# Patient Record
Sex: MALE | Race: BLACK OR AFRICAN AMERICAN | Employment: PART TIME | URBAN - METROPOLITAN AREA
[De-identification: names, ages, dates, MRNs, and addresses within clinical notes are randomized per-mention and may not be internally consistent; named-entity substitution may affect disease eponyms.]

---

## 2017-04-06 ENCOUNTER — APPOINTMENT (OUTPATIENT)
Dept: GENERAL RADIOLOGY | Age: 57
End: 2017-04-06
Payer: MEDICARE

## 2017-04-06 ENCOUNTER — HOSPITAL ENCOUNTER (EMERGENCY)
Age: 57
Discharge: HOME | End: 2017-04-06
Attending: EMERGENCY MEDICINE
Payer: MEDICARE

## 2017-04-06 VITALS
BODY MASS INDEX: 37.03 KG/M2 | HEART RATE: 80 BPM | WEIGHT: 250 LBS | RESPIRATION RATE: 16 BRPM | HEIGHT: 69 IN | DIASTOLIC BLOOD PRESSURE: 85 MMHG | OXYGEN SATURATION: 100 % | SYSTOLIC BLOOD PRESSURE: 124 MMHG | TEMPERATURE: 97.6 F

## 2017-04-06 DIAGNOSIS — M25.461 EFFUSION OF KNEE JOINT RIGHT: Primary | ICD-10-CM

## 2017-04-06 PROCEDURE — 99283 EMERGENCY DEPT VISIT LOW MDM: CPT

## 2017-04-06 PROCEDURE — 73562 X-RAY EXAM OF KNEE 3: CPT

## 2017-04-06 RX ORDER — NAPROXEN 500 MG/1
500 TABLET ORAL 2 TIMES DAILY WITH MEALS
Qty: 30 TABLET | Refills: 0 | Status: SHIPPED | OUTPATIENT
Start: 2017-04-06 | End: 2017-05-29

## 2017-04-06 RX ORDER — NAPROXEN 500 MG/1
500 TABLET ORAL 2 TIMES DAILY WITH MEALS
Qty: 60 TABLET | Refills: 0 | Status: SHIPPED | OUTPATIENT
Start: 2017-04-06 | End: 2017-04-06

## 2017-04-06 ASSESSMENT — ENCOUNTER SYMPTOMS
EYES NEGATIVE: 1
RESPIRATORY NEGATIVE: 1
GASTROINTESTINAL NEGATIVE: 1
ALLERGIC/IMMUNOLOGIC NEGATIVE: 1

## 2017-04-06 ASSESSMENT — PAIN DESCRIPTION - ORIENTATION: ORIENTATION: RIGHT

## 2017-04-06 ASSESSMENT — PAIN DESCRIPTION - LOCATION: LOCATION: KNEE

## 2017-04-06 ASSESSMENT — PAIN SCALES - GENERAL: PAINLEVEL_OUTOF10: 8

## 2017-05-29 ENCOUNTER — HOSPITAL ENCOUNTER (EMERGENCY)
Age: 57
Discharge: HOME | End: 2017-05-30
Attending: EMERGENCY MEDICINE
Payer: MEDICARE

## 2017-05-29 VITALS
DIASTOLIC BLOOD PRESSURE: 99 MMHG | SYSTOLIC BLOOD PRESSURE: 155 MMHG | HEIGHT: 66 IN | TEMPERATURE: 98.1 F | HEART RATE: 86 BPM | RESPIRATION RATE: 22 BRPM | WEIGHT: 257.5 LBS | BODY MASS INDEX: 41.38 KG/M2 | OXYGEN SATURATION: 98 %

## 2017-05-29 DIAGNOSIS — Z76.0 ENCOUNTER FOR MEDICATION REFILL: ICD-10-CM

## 2017-05-29 DIAGNOSIS — M79.89 LEG SWELLING: ICD-10-CM

## 2017-05-29 DIAGNOSIS — M25.561 RIGHT KNEE PAIN, UNSPECIFIED CHRONICITY: Primary | ICD-10-CM

## 2017-05-29 LAB
ABSOLUTE EOS #: 0.2 K/UL (ref 0–0.4)
ABSOLUTE LYMPH #: 1.9 K/UL (ref 1–4.8)
ABSOLUTE MONO #: 0.3 K/UL (ref 0.2–0.8)
BASOPHILS # BLD: 1 %
BASOPHILS ABSOLUTE: 0.1 K/UL (ref 0–0.2)
D-DIMER QUANTITATIVE: 1.23 MG/L FEU
DIFFERENTIAL TYPE: ABNORMAL
EOSINOPHILS RELATIVE PERCENT: 3 %
HCT VFR BLD CALC: 38.2 % (ref 41–53)
HEMOGLOBIN: 12.4 G/DL (ref 13.5–17.5)
LYMPHOCYTES # BLD: 27 %
MCH RBC QN AUTO: 26.2 PG (ref 26–34)
MCHC RBC AUTO-ENTMCNC: 32.4 G/DL (ref 31–37)
MCV RBC AUTO: 81 FL (ref 80–100)
MONOCYTES # BLD: 5 %
PDW BLD-RTO: 17.1 % (ref 11.5–14.5)
PLATELET # BLD: 229 K/UL (ref 130–400)
PLATELET ESTIMATE: ABNORMAL
PMV BLD AUTO: ABNORMAL FL (ref 6–12)
RBC # BLD: 4.72 M/UL (ref 4.5–5.9)
RBC # BLD: ABNORMAL 10*6/UL
SEG NEUTROPHILS: 64 %
SEGMENTED NEUTROPHILS ABSOLUTE COUNT: 4.6 K/UL (ref 1.8–7.7)
WBC # BLD: 7.1 K/UL (ref 3.5–11)
WBC # BLD: ABNORMAL 10*3/UL

## 2017-05-29 PROCEDURE — 36415 COLL VENOUS BLD VENIPUNCTURE: CPT

## 2017-05-29 PROCEDURE — 85379 FIBRIN DEGRADATION QUANT: CPT

## 2017-05-29 PROCEDURE — 99283 EMERGENCY DEPT VISIT LOW MDM: CPT

## 2017-05-29 PROCEDURE — 85025 COMPLETE CBC W/AUTO DIFF WBC: CPT

## 2017-05-29 ASSESSMENT — PAIN DESCRIPTION - ORIENTATION: ORIENTATION: RIGHT

## 2017-05-29 ASSESSMENT — PAIN DESCRIPTION - LOCATION: LOCATION: KNEE

## 2017-05-29 ASSESSMENT — PAIN SCALES - GENERAL: PAINLEVEL_OUTOF10: 9

## 2017-05-29 ASSESSMENT — PAIN DESCRIPTION - PAIN TYPE: TYPE: ACUTE PAIN

## 2017-05-30 ENCOUNTER — HOSPITAL ENCOUNTER (OUTPATIENT)
Dept: VASCULAR LAB | Age: 57
Discharge: HOME | End: 2017-05-30
Payer: MEDICARE

## 2017-05-30 DIAGNOSIS — R60.9 SWELLING: Primary | ICD-10-CM

## 2017-05-30 PROCEDURE — 93971 EXTREMITY STUDY: CPT

## 2017-05-30 RX ORDER — EZETIMIBE 10 MG/1
10 TABLET ORAL DAILY
Qty: 30 TABLET | Refills: 0 | Status: SHIPPED | OUTPATIENT
Start: 2017-05-30

## 2017-05-30 RX ORDER — LOSARTAN POTASSIUM AND HYDROCHLOROTHIAZIDE 25; 100 MG/1; MG/1
1 TABLET ORAL DAILY
Qty: 30 TABLET | Refills: 0 | Status: SHIPPED | OUTPATIENT
Start: 2017-05-30

## 2017-05-30 RX ORDER — CLOPIDOGREL BISULFATE 75 MG/1
75 TABLET ORAL DAILY
Qty: 30 TABLET | Refills: 0 | Status: SHIPPED | OUTPATIENT
Start: 2017-05-30

## 2017-05-30 RX ORDER — FAMOTIDINE 20 MG/1
20 TABLET, FILM COATED ORAL 2 TIMES DAILY
Qty: 60 TABLET | Refills: 0 | Status: SHIPPED | OUTPATIENT
Start: 2017-05-30

## 2017-05-30 RX ORDER — ATORVASTATIN CALCIUM 80 MG/1
80 TABLET, FILM COATED ORAL DAILY
Qty: 30 TABLET | Refills: 0 | Status: SHIPPED | OUTPATIENT
Start: 2017-05-30

## 2019-12-27 ENCOUNTER — HOSPITAL ENCOUNTER (EMERGENCY)
Age: 59
Discharge: HOME | End: 2019-12-28
Payer: MEDICARE

## 2019-12-27 ENCOUNTER — APPOINTMENT (OUTPATIENT)
Dept: GENERAL RADIOLOGY | Age: 59
End: 2019-12-27
Payer: MEDICARE

## 2019-12-27 VITALS
TEMPERATURE: 98.2 F | HEART RATE: 81 BPM | DIASTOLIC BLOOD PRESSURE: 83 MMHG | HEIGHT: 68 IN | BODY MASS INDEX: 39.07 KG/M2 | OXYGEN SATURATION: 100 % | WEIGHT: 257.8 LBS | RESPIRATION RATE: 18 BRPM | SYSTOLIC BLOOD PRESSURE: 130 MMHG

## 2019-12-27 DIAGNOSIS — L97.529 ULCER OF LEFT FOOT, UNSPECIFIED ULCER STAGE (HCC): Primary | ICD-10-CM

## 2019-12-27 PROCEDURE — 73130 X-RAY EXAM OF HAND: CPT

## 2019-12-27 PROCEDURE — 99282 EMERGENCY DEPT VISIT SF MDM: CPT

## 2019-12-27 RX ORDER — CEPHALEXIN 500 MG/1
500 CAPSULE ORAL 4 TIMES DAILY
Qty: 40 CAPSULE | Refills: 0 | Status: SHIPPED | OUTPATIENT
Start: 2019-12-27 | End: 2020-01-06

## 2019-12-27 ASSESSMENT — PAIN SCALES - GENERAL: PAINLEVEL_OUTOF10: 8

## 2021-10-19 ENCOUNTER — APPOINTMENT (OUTPATIENT)
Dept: GENERAL RADIOLOGY | Age: 61
End: 2021-10-19
Payer: MEDICARE

## 2021-10-19 ENCOUNTER — HOSPITAL ENCOUNTER (EMERGENCY)
Age: 61
Discharge: HOME | End: 2021-10-19
Attending: EMERGENCY MEDICINE
Payer: MEDICARE

## 2021-10-19 VITALS
BODY MASS INDEX: 38.65 KG/M2 | HEART RATE: 76 BPM | SYSTOLIC BLOOD PRESSURE: 159 MMHG | DIASTOLIC BLOOD PRESSURE: 90 MMHG | HEIGHT: 68 IN | TEMPERATURE: 98.2 F | RESPIRATION RATE: 18 BRPM | OXYGEN SATURATION: 98 % | WEIGHT: 255 LBS

## 2021-10-19 DIAGNOSIS — M54.2 NECK PAIN: Primary | ICD-10-CM

## 2021-10-19 PROCEDURE — 72040 X-RAY EXAM NECK SPINE 2-3 VW: CPT

## 2021-10-19 PROCEDURE — 73030 X-RAY EXAM OF SHOULDER: CPT

## 2021-10-19 PROCEDURE — 99283 EMERGENCY DEPT VISIT LOW MDM: CPT

## 2021-10-19 RX ORDER — ACETAMINOPHEN AND CODEINE PHOSPHATE 300; 30 MG/1; MG/1
1 TABLET ORAL EVERY 6 HOURS PRN
Qty: 8 TABLET | Refills: 0 | Status: SHIPPED | OUTPATIENT
Start: 2021-10-19 | End: 2021-10-21

## 2021-10-19 RX ORDER — IBUPROFEN 800 MG/1
800 TABLET ORAL EVERY 6 HOURS PRN
Qty: 21 TABLET | Refills: 0 | Status: SHIPPED | OUTPATIENT
Start: 2021-10-19

## 2021-10-19 RX ORDER — METHOCARBAMOL 750 MG/1
750 TABLET, FILM COATED ORAL 4 TIMES DAILY
Qty: 40 TABLET | Refills: 0 | Status: SHIPPED | OUTPATIENT
Start: 2021-10-19 | End: 2021-10-29

## 2021-10-19 ASSESSMENT — PAIN DESCRIPTION - FREQUENCY: FREQUENCY: CONTINUOUS

## 2021-10-19 ASSESSMENT — PAIN DESCRIPTION - ORIENTATION: ORIENTATION: RIGHT

## 2021-10-19 ASSESSMENT — PAIN SCALES - GENERAL: PAINLEVEL_OUTOF10: 9

## 2021-10-19 ASSESSMENT — PAIN DESCRIPTION - PAIN TYPE: TYPE: ACUTE PAIN

## 2021-10-19 ASSESSMENT — PAIN DESCRIPTION - LOCATION: LOCATION: SHOULDER

## 2021-10-20 ASSESSMENT — ENCOUNTER SYMPTOMS
SORE THROAT: 0
DIARRHEA: 0
CONSTIPATION: 0
COLOR CHANGE: 0
ABDOMINAL PAIN: 0
WHEEZING: 0
RHINORRHEA: 0
VOMITING: 0
SINUS PRESSURE: 0
NAUSEA: 0
COUGH: 0
SHORTNESS OF BREATH: 0

## 2021-10-20 NOTE — ED PROVIDER NOTES
[address] [place] [address] ED  eMERGENCY dEPARTMENT eNCOUnter      Pt Name: Mic Sadler  MRN: [id number]  [place] 1960  Date of evaluation: 10/19/2021  Provider: Tamara Yang NP, DIXIE - [address]       Chief Complaint   Patient presents with    Neck Pain     Ongoing shoulder/neck pain for 1 month that has been gradually worsening    Shoulder Pain         HISTORY OF PRESENT ILLNESS  (Location/Symptom, Timing/Onset, Context/Setting, Quality, Duration, Modifying Factors, Severity.)   Mic Sadler is a 59 y.o. male who presents to the emergency department at vehicle for evaluation of neck pain. Patient states that he has been having some right-sided neck and shoulder pain for the last 1 week. Symptoms of injury, fall, or trauma. He states that it is worse when he moves or tries to sit up. He states he feels all over on long blood right side of his lateral neck. He denies any numbness or tingling to the fingers. He rates the pain a 9 on a 0-to-10 scale. Nursing Notes were reviewed. ALLERGIES     Patient has no known allergies.     CURRENT MEDICATIONS       Discharge Medication List as of 10/19/2021 10:05 PM      CONTINUE these medications which have NOT CHANGED    Details   clopidogrel (PLAVIX) 75 MG tablet Take 1 tablet by mouth daily, Disp-30 tablet, R-0Print      atorvastatin (LIPITOR) 80 MG tablet Take 1 tablet by mouth daily, Disp-30 tablet, R-0Print      famotidine (PEPCID) 20 MG tablet Take 1 tablet by mouth 2 times daily, Disp-60 tablet, R-0Print      losartan-hydrochlorothiazide (HYZAAR) 100-25 MG per tablet Take 1 tablet by mouth daily, Disp-30 tablet, R-0Print      ezetimibe (ZETIA) 10 MG tablet Take 1 tablet by mouth daily, Disp-30 tablet, R-0Print      insulin NPH (HUMULIN N;NOVOLIN N) 100 UNIT/ML injection vial Inject into the skin Indications: via VEGO (daily insulin pump)Historical Med      naproxen (NAPROSYN) 500 MG tablet Take 1 tablet by mouth 2 times daily (with meals) for 15 doses, Disp-30 tablet, R-0Print      glipiZIDE (GLUCOTROL) 5 MG tablet Take 5 mg by mouth 2 times daily (before meals)      canagliflozin (INVOKANA) 100 MG TABS tablet Take 100 mg by mouth every morning (before breakfast)      Iron Polysacch Amnsc-E28-PN (POLY-IRON 150 FORTE PO) Take by mouth daily      linagliptin (TRADJENTA) 5 MG tablet Take 5 mg by mouth daily      Exenatide 2 MG PEN Inject into the skin once a week      Cholecalciferol (VITAMIN D3) 68741 UNITS CAPS Take 1 capsule by mouth twice a week             PAST MEDICAL HISTORY         Diagnosis Date    [facility]erebral artery occlusion with [facility] infarction [facility]     Diabetes mellitus ([facility] [facility]     Hyperlipidemia     Hypertension        SURGICAL HISTORY           Procedure Laterality Date    BACK SURGERY      BICEPS TENDON REPAIR      KNEE SURGERY  2016    ROTATOR CUFF REPAIR           FAMILY HISTORY           Problem Relation Age of Onset    Diabetes Mother     Diabetes Paternal Uncle      Family Status   Relation Name Status    Mother  Alive   [address]  (Not Specified)        SOCIAL HISTORY      reports that he has never smoked. He has never used smokeless tobacco. He reports that he does not drink alcohol and does not use drugs. REVIEW OF SYSTEMS    (2-9 systems for level 4, 10 or more for level 5)     Review of Systems   Constitutional: Negative for chills, fever and unexpected weight change. HENT: Negative for congestion, rhinorrhea, sinus pressure and sore throat. Respiratory: Negative for cough, shortness of breath and wheezing. Cardiovascular: Negative for chest pain and palpitations. Gastrointestinal: Negative for abdominal pain, constipation, diarrhea, nausea and vomiting. Genitourinary: Negative for dysuria and hematuria. Musculoskeletal: Positive for neck pain. Negative for arthralgias and myalgias. Skin: Negative for color change and rash. Neurological: Negative for dizziness, weakness and headaches. Hematological: Negative for adenopathy. All other systems reviewed and are negative. Except as noted above the remainder of the review of systems was reviewed and negative. PHYSICAL EXAM    (up to 7 for level 4, 8 or more for level 5)     ED Triage Vitals [10/19/21 1926]   BP Temp Temp src Pulse Resp SpO2 Height Weight   (!) 159/90 98.2 °F (36.8 °C) -- 76 18 98 % 5' 8\" (1.727 m) 255 lb (115.7 kg)       Physical Exam  Vitals reviewed. Constitutional:       Appearance: He is well-developed. HENT:      Head: Normocephalic and atraumatic. Eyes:      Conjunctiva/sclera: Conjunctivae normal.      Pupils: Pupils are equal, round, and reactive to light. Cardiovascular:      Rate and Rhythm: Normal rate and regular rhythm. Pulmonary:      Effort: Pulmonary effort is normal. No respiratory distress. Breath sounds: Normal breath sounds. No stridor. Abdominal:      General: Bowel sounds are normal.      Palpations: Abdomen is soft. Musculoskeletal:         General: Normal range of motion. Arms:       Cervical back: Normal range of motion and neck supple. Lymphadenopathy:      Cervical: No cervical adenopathy. Skin:     General: Skin is warm and dry. Findings: No rash. Neurological:      Mental Status: He is alert and oriented to person, place, and time. RADIOLOGY:   Non-plain film images such as CT, Ultrasound and MRI are read by the radiologist. Plain radiographic images are visualized and preliminarily interpreted by the emergency physician with the below findings:    XR CERVICAL SPINE (2-3 VIEWS)    Result Date: 10/19/2021  EXAMINATION: XRAY VIEWS OF THE CERVICAL SPINE 10/19/2021 6:26 pm COMPARISON: None.  HISTORY: ORDERING SYSTEM PROVIDED HISTORY: neck pain TECHNOLOGIST PROVIDED HISTORY: neck pain Reason for Exam: Pt states ongoing right neck and shoulder pain x 1 month possibly from lifting boxes Acuity: Unknown Type of Exam: Initial FINDINGS: No fractures or subluxations were noted. There is disc space narrowing with eburnation of the vertebral endplates at the P4-0, D4-4 and C6-7 levels. The remaining intervertebral disc spaces are of normal height. The posterior elements and paraspinal soft tissues are intact. Multilevel cervical spondylosis and degenerative disc disease as described above No acute bony abnormalities are noted     XR SHOULDER RIGHT (MIN 2 VIEWS)    Result Date: 10/19/2021  EXAMINATION: THREE XRAY VIEWS OF THE RIGHT SHOULDER 10/19/2021 9:26 pm COMPARISON: 12/06/2009 HISTORY: ORDERING SYSTEM PROVIDED HISTORY: Pain TECHNOLOGIST PROVIDED HISTORY: Pain Reason for Exam: Pt states ongoing right neck and shoulder pain x 1 month possibly from lifting boxes Acuity: Unknown Type of Exam: Initial FINDINGS: No acute fracture. No dislocation. There is narrowing in the glenohumeral and acromioclavicular joints. Degenerative changes without acute osseous abnormality. Interpretation per the Radiologist below, if available at the time of this note:    XR CERVICAL SPINE (2-3 VIEWS)   Final Result   Multilevel cervical spondylosis and degenerative disc disease as described   above      No acute bony abnormalities are noted         XR SHOULDER RIGHT (MIN 2 VIEWS)   Final Result   Degenerative changes without acute osseous abnormality. LABS:  Labs Reviewed - No data to display    All other labs were within normal range or not returned as of this dictation. EMERGENCY DEPARTMENT COURSE and DIFFERENTIAL DIAGNOSIS/MDM:   Vitals:    Vitals:    10/19/21 1926   BP: (!) 159/90   Pulse: 76   Resp: 18   Temp: 98.2 °F (36.8 °C)   SpO2: 98%   Weight: 255 lb (115.7 kg)   Height: 5' 8\" (1.727 m)       Medical Decision Making: X-rays/symptoms spondylolisthesis. He is able to be discharged home on some medications. Follow-up with primary care physician for recheck reevaluation. FINAL IMPRESSION      1.  Neck pain          DISPOSITION/PLAN   DISPOSITION Decision To Discharge 10/19/2021 10:04:04 PM      PATIENT REFERRED TO:   same day PCP  [phone]          DISCHARGE MEDICATIONS:     Discharge Medication List as of 10/19/2021 10:05 PM      START taking these medications    Details   methocarbamol (ROBAXIN-750) 750 MG tablet Take 1 tablet by mouth 4 times daily for 10 days, Disp-40 tablet, R-0Print      ibuprofen (IBU) 800 MG tablet Take 1 tablet by mouth every 6 hours as needed for Pain, Disp-21 tablet, R-0Print      acetaminophen-codeine (TYLENOL/CODEINE #3) 300-30 MG per tablet Take 1 tablet by mouth every 6 hours as needed for Pain for up to 2 days. , Disp-8 tablet, R-0Print                 (Please note that portions of this note were completed with a voice recognition program.  Efforts were made to edit the dictations but occasionally words are mis-transcribed.)    [facility] NP, APRN - CNP  Certified Nurse Practitioner        DIXIE Espinoza CNP  10/20/21 0020

## 2021-10-20 NOTE — ED PROVIDER NOTES
The patient was seen and examined by me in conjunction with the mid-level provider. I agree with his/her assessment and treatment plan. Is no mass or bruising or erythema present. Shoulder has full range of motion.      Victorina Steel MD  10/19/21 6432

## 2023-11-21 ENCOUNTER — OFFICE VISIT (OUTPATIENT)
Age: 63
End: 2023-11-21
Payer: MEDICARE

## 2023-11-21 VITALS
SYSTOLIC BLOOD PRESSURE: 152 MMHG | DIASTOLIC BLOOD PRESSURE: 94 MMHG | WEIGHT: 240 LBS | HEART RATE: 76 BPM | BODY MASS INDEX: 36.37 KG/M2 | HEIGHT: 68 IN

## 2023-11-21 DIAGNOSIS — M48.062 LUMBAR STENOSIS WITH NEUROGENIC CLAUDICATION: Primary | ICD-10-CM

## 2023-11-21 PROCEDURE — 99214 OFFICE O/P EST MOD 30 MIN: CPT | Performed by: NEUROLOGICAL SURGERY

## 2023-11-21 PROCEDURE — 1123F ACP DISCUSS/DSCN MKR DOCD: CPT | Performed by: NEUROLOGICAL SURGERY

## 2023-11-21 RX ORDER — LOSARTAN POTASSIUM 50 MG/1
TABLET ORAL
COMMUNITY
Start: 2023-11-13

## 2023-11-21 RX ORDER — ATORVASTATIN CALCIUM 80 MG/1
80 TABLET, FILM COATED ORAL NIGHTLY
COMMUNITY

## 2023-11-21 RX ORDER — HYDROCHLOROTHIAZIDE 12.5 MG/1
TABLET ORAL
COMMUNITY
Start: 2023-11-13

## 2023-11-21 RX ORDER — TAMSULOSIN HYDROCHLORIDE 0.4 MG/1
CAPSULE ORAL
COMMUNITY
Start: 2023-11-13

## 2023-11-21 RX ORDER — ASPIRIN 81 MG/1
81 TABLET, CHEWABLE ORAL DAILY
COMMUNITY

## 2023-11-22 NOTE — PROGRESS NOTES
at [facility] on November 3, 2023. I also reviewed the radiologist's report. Again, the radiologist has labeled the left fused level as L4-5, which I think is likely accurate due to the presence of a lumbarization of S1. With this counting of levels, he has significant stenosis at L3-4 and L2-3. The radiologist has described this as at least moderate, but I think it should be called severe. These levels are also described as being more severe than on previous imaging. Assessment and Plan:     1. Lumbar stenosis with neurogenic claudication        Mr. Abdullahi Babcock has symptoms consistent with neurogenic claudication and evidence of severe canal stenosis at L2-3 and L3-4. I think he would likely benefit from surgical decompression. We discussed the risks, benefits, and alternatives to this approach in the office today. He had an ample opportunity to have his questions answered. He provided his consent to proceed with surgery. We will work to get him on the schedule in the near future. He does take aspirin and Plavix related to his previous stroke, and we will need to hold this for 1 week before and after surgery. I also suggested it might be worthwhile to see an orthopedic surgeon for evaluation of this possible shoulder injury. I did note that he has some difficulty abducting the right arm. Electronically signed by Philippe Perkins MD on 11/21/2023 at 11:12 PM    Please note that this chart was generated using voice recognition Dragon dictation software. Although every effort was made to ensure the accuracy of this automated transcription, some errors in transcription may have occurred.

## 2024-01-26 ENCOUNTER — HOSPITAL ENCOUNTER (OUTPATIENT)
Dept: PREADMISSION TESTING | Age: 64
End: 2024-01-26
Payer: MEDICARE

## 2024-01-26 VITALS
SYSTOLIC BLOOD PRESSURE: 158 MMHG | WEIGHT: 252.43 LBS | TEMPERATURE: 97.3 F | BODY MASS INDEX: 37.39 KG/M2 | OXYGEN SATURATION: 100 % | HEIGHT: 69 IN | HEART RATE: 76 BPM | DIASTOLIC BLOOD PRESSURE: 96 MMHG | RESPIRATION RATE: 14 BRPM

## 2024-01-26 LAB
EST. AVERAGE GLUCOSE BLD GHB EST-MCNC: 209 MG/DL
HBA1C MFR BLD: 8.9 % (ref 4–6)

## 2024-01-26 PROCEDURE — 93005 ELECTROCARDIOGRAM TRACING: CPT | Performed by: ANESTHESIOLOGY

## 2024-01-26 PROCEDURE — 83036 HEMOGLOBIN GLYCOSYLATED A1C: CPT

## 2024-01-26 PROCEDURE — 36415 COLL VENOUS BLD VENIPUNCTURE: CPT

## 2024-01-26 RX ORDER — ROSUVASTATIN CALCIUM 20 MG/1
20 TABLET, COATED ORAL DAILY
COMMUNITY

## 2024-01-26 RX ORDER — CHLORAL HYDRATE 500 MG
1 CAPSULE ORAL DAILY
COMMUNITY

## 2024-01-26 RX ORDER — DULAGLUTIDE 3 MG/.5ML
3 INJECTION, SOLUTION SUBCUTANEOUS WEEKLY
COMMUNITY

## 2024-01-26 RX ORDER — TESTOSTERONE 200 MG
PELLET (EA) IMPLANTATION
COMMUNITY

## 2024-01-26 NOTE — PRE-PROCEDURE INSTRUCTIONS
1. If you are having any type of anesthesia, you are to have NOTHING to eat or drink after midnight the night before surgery.  This includes no gum, hard candy, mints or water.  The only exception to this is small sips of water to take the medications listed above.  No smoking or chewing tobacco after midnight.  No alcoholic beverages for 24 hours prior to surgery.  2. You may brush your teeth but do not swallow the water.  3. If you wear glasses bring a case for them if you have one.  No contacts should be worn the day of surgery.  You may also bring your hearing aids. If you have dentures, most surgical procedures involving anesthesia will require that you remove them prior to surgery.  4. If you sleep with a CPAP or BiPAP machine at home and plan on staying in the hospital overnight after surgery, please bring your machine with you.   5. Do not wear any jewelry or body piercings the day of surgery.  No nail polish on the operative extremity (arm/hand or leg/foot surgeries)   6. If you are staying overnight with us, you may bring a small bag of necessary personal items.   7. Please wear loose, comfortable clothing.  If you are potentially going to have a cast, sling, brace or bulky dressing, make sure to wear clothing that will fit over it.   8. In case of illness - If you have cold or flu like symptoms (high fever, runny nose, sore throat, cough, etc.) rash, nausea, vomiting, loose stools, and/or recent contact with someone who has a contagious disease (chicken pox, measles, COVID-19, etc.).  Please call your surgeon before coming to the hospital.    Transportation After Your Surgery/Procedure:     If you are going home the same day of surgery you need someone to drive you home.  Your  must be at least 18 years of age.  A taxi cab or other nonmedical public transportation is not acceptable unless you have someone to ride home in the vehicle with you.   For your safety, someone must remain with you for the  first 24 hours after your surgery if you receive anesthesia or medication.  If you do not have someone to stay with you, your procedure may be cancelled.  As a patient at [facility] you can expect quality medical and nursing care that is centered on you individual needs.  Our goal is to make your surgical experience as comfortable as possible.    Any questions about preparing for your surgery please call [phone].      ____________________________   ____________________________  Signature (Patient)                                 Signature (Nurse)                     Date

## 2024-01-26 NOTE — H&P
PAT Progress Note    Pt Name: Curtis Ojeda  MRN: [id number]  YOB: 1960  Date of evaluation: 1/26/2024      [x] Called to PAT. I spoke to the patient, Curtis Ojeda, a 63 y.o. male, who presented to PAT today for an upcoming L 2-4  LUMBAR LAMINECTOMY POSTERIOR by DR JOSÉ MIGUEL REGALADO for SPINAL STENOSIS of LUMBAR REGION, UNSPECIFIED WHETHER NEUROGENIC CLAUDICATION PRESENT [M48.061] SCHEDULED 2/9/2024 @ 1015. He currently follows with Dr José Miguel Regalado for lower back pain with radiation to lower extremities \"consistent with neurogenic claudication\" per Dr Regalado's Note of 11/21/23. Patient participated in PT but did not find it helpful. He feels unsteady and falls often \"because my legs would give out and I don't feel them when I walk.\" Asked if using any devices (ie: cane, walker etec.) and stated \"I'm not able to use those devices.\" Advised to be cautious and avoid unnecessary activities that could put him at risk for falling.  Dr Regalado recommended surgical intervention to which the patient agreed.      Patient has known HDL, HTN, DM treated with medication Remote CVA (2003) on ASA 81mg daily since. These are all managed by Primary Care, Dr Lew with an upcoming appointment for Medical/Surgical Clearance on 2/1/24  Patient additionally has an upcoming appointment with Nephrologist, Dr Ambriz on 1/30/24. The notes can be used for an Interval H&P on the day of his surgery, 2/8/24.     Vital signs: BP (!) 158/96   Pulse 76   Temp 97.3 °F (36.3 °C) (Infrared)   Resp 14   Ht 1.74 m (5' 8.5\")   Wt 114.5 kg (252 lb 6.8 oz)   SpO2 100%   BMI 37.82 kg/m²     This is a 63 y.o.obese male who is pleasant, cooperative, alert and oriented x3, in no acute distress.    Heart: Heart sounds are normal.  HR 76 regular rate and rhythm without murmur, gallop or rub.  No carotid bruits   Lungs: SpO2 100% room air Normal respiratory effort with equal expansion, good air exchange, unlabored and clear to auscultation  without wheezes or rales bilaterally   No CVA tenderness   Abdomen: soft, obese, nontender, nondistended with bowel sounds .   Extremities/Neuro: slow cautious gait  1-2+ bilateral lower extremity edema 2+pitting edema bilateral ankles decreasing going up to knees. PT pulses present No calf tenderness decreased  Mildly decreased Quad strength bilaterally  Decreased right shoulder ROM due to discomfort since recent fall.  Hand grasps equal bilaterally       Investigations:      Laboratory Testing:  Recent Results (from the past 24 hour(s))   EKG 12 Lead    Collection Time: 01/26/24  2:20 PM   Result Value Ref Range    Ventricular Rate 76 BPM    Atrial Rate 76 BPM    P-R Interval 182 ms    QRS Duration 92 ms    Q-T Interval 384 ms    QTc Calculation (Bazett) 432 ms    P Axis 48 degrees    R Axis -21 degrees    T Axis 16 degrees       Recent Labs     01/23/24  0926   HGB 12.0*   HCT 36.6*   WBC 6.21   MCV 81.0      K 4.3      CO2 31*   BUN 18   CREATININE 1.51*   GLUCOSE 203*   AST 41   ALT 33   LABALBU 3.9       No results for input(s): \"COVID19\" in the last 720 hours.  Imaging/Diagnostics:    No results found.    Tanya Rodriguez, DIXIE - CNP    Electronically signed 1/26/2024 at 2:36 PM

## 2024-01-27 LAB
EKG ATRIAL RATE: 76 BPM
EKG P AXIS: 48 DEGREES
EKG P-R INTERVAL: 182 MS
EKG Q-T INTERVAL: 384 MS
EKG QRS DURATION: 92 MS
EKG QTC CALCULATION (BAZETT): 432 MS
EKG R AXIS: -21 DEGREES
EKG T AXIS: 16 DEGREES
EKG VENTRICULAR RATE: 76 BPM

## 2024-01-30 NOTE — PERIOP NOTE
1/29/24 1640 - Dr. Hernandez reviewed chart including H&P, labs, cardiology note from 2019, EKG, functional capacity (6), and echo from 2017. Requesting PCP clearance.    1/30/24 0909 - Spoke with Jose E at Dr. Regalado's office regarding above. Confirmed medical clearance request was sent to Dr. Lew. Patient reported he has appointment scheduled on 2/1/24.    1/30/24 3950 - Left message for patient reminding him to contact his endocrinologist regarding insulin dosage prior to surgery as patient has insulin pump.

## 2024-02-02 ENCOUNTER — TELEPHONE (OUTPATIENT)
Age: 64
End: 2024-02-02

## 2024-02-02 NOTE — TELEPHONE ENCOUNTER
2/2/24 9am - spoke w pt - he will call us back to get on surgery schedule when his hgbA1C is <9.0. Will forward to  to cancel surgery for 2/9. JADE Browne RN  2/2/24 Reviewed PCP clearance note - states HcA1C \"at his endcrinologist's office 2 days agowas 9.4\" then states later in his note \"Would recommdn delyaing operation until glucose control improves.\"  Dr Regalado reviewed this note - stated \" I agree- need to reschedule when AC<9.\"  I will contact pt and let LH know.  JADE Borwne RN

## 2024-03-26 ENCOUNTER — TELEPHONE (OUTPATIENT)
Age: 64
End: 2024-03-26

## 2024-03-26 NOTE — TELEPHONE ENCOUNTER
4/2/24 I discussed w Dr Regalado - he would like to see pt back prior to surgery - has not seen pt since November 2023. Scheduled for 4/4 @ 11: 50 am - left message for pt to please call back to confirm. JADE Browne RN    3/27/24 I am checking w Dr Reyes today about this - does he want a clearance note or not.... JADE Browne RN  3/26/24 Pt called to reschedule surgery - Hgb A1C now 7.9. JADE Browne RN

## 2024-04-02 NOTE — H&P (VIEW-ONLY)
HISTORY and PHYSICAL  [facility]       NAME:  Curtis Ojeda  MRN: [id number]   YOB: 1960   Date: 4/3/2024   Age: 63 y.o.  Gender: male     COMPLAINT AND PRESENT HISTORY:   Curtis Ojeda is 63 y.o.,  male, presents for pre-anesthesia/admission testing for OPEN LUMBAR LAMINECTOMY POSTERIOR L2-L4 per Dr. Regalado.  Primary dx: Spinal stenosis of lumbar region, unspecified whether neurogenic claudication present [M48.061].    Office note per Dr Regalado on 11/21/2023  HPI:  Mr. Ojeda returns to the office today as an established patient for further evaluation of lower back and lower extremity pain.  I had seen him last in the office on August 31.  He was describing some symptoms that were consistent with neurogenic claudication.  He was previously a patient of Dr. Weinberg, who performed a posterior lumbar fusion.  This had previously been described as L3-4, but this was most likely L4-5 due to some transitional anatomy.  Dr. Weinberg had seen him last in 2017 and had offered surgery at that time, but for a variety of reasons this did not take place.  I provided an order for physical therapy at the time of his last appointment.  Today, he reports that he did not find physical therapy to be helpful.  He has had a couple of falls, that he attributes to a feeling that his legs are giving out.  He also has a little bit of pain around the right shoulder, which he thinks is related to a possible injury to the shoulder.  He is concerned that he may have injured the rotator cuff.  His back and lower extremity symptoms continue to be consistent with claudication.     Imaging: I personally reviewed a new MRI of the lumbar spine, which was obtained at [facility] on November 3, 2023.  I also reviewed the radiologist's report.  Again, the radiologist has labeled the left fused level as L4-5, which I think is likely accurate due to the presence of a lumbarization of S1.  With this counting of levels, he

## 2024-04-02 NOTE — H&P
HISTORY and PHYSICAL  [facility]       NAME:  Curtis Ojeda  MRN: [id number]   YOB: 1960   Date: 4/3/2024   Age: 63 y.o.  Gender: male     COMPLAINT AND PRESENT HISTORY:   Curtis Ojeda is 63 y.o.,  male, presents for pre-anesthesia/admission testing for OPEN LUMBAR LAMINECTOMY POSTERIOR L2-L4 per Dr. Regalado.  Primary dx: Spinal stenosis of lumbar region, unspecified whether neurogenic claudication present [M48.061].    Office note per Dr Regalado on 11/21/2023  HPI:  Mr. Ojeda returns to the office today as an established patient for further evaluation of lower back and lower extremity pain.  I had seen him last in the office on August 31.  He was describing some symptoms that were consistent with neurogenic claudication.  He was previously a patient of Dr. Weinberg, who performed a posterior lumbar fusion.  This had previously been described as L3-4, but this was most likely L4-5 due to some transitional anatomy.  Dr. Weinberg had seen him last in 2017 and had offered surgery at that time, but for a variety of reasons this did not take place.  I provided an order for physical therapy at the time of his last appointment.  Today, he reports that he did not find physical therapy to be helpful.  He has had a couple of falls, that he attributes to a feeling that his legs are giving out.  He also has a little bit of pain around the right shoulder, which he thinks is related to a possible injury to the shoulder.  He is concerned that he may have injured the rotator cuff.  His back and lower extremity symptoms continue to be consistent with claudication.     Imaging: I personally reviewed a new MRI of the lumbar spine, which was obtained at [facility] on November 3, 2023.  I also reviewed the radiologist's report.  Again, the radiologist has labeled the left fused level as L4-5, which I think is likely accurate due to the presence of a lumbarization of S1.  With this counting of levels, he        SOCIAL HISTORY       Social History     Socioeconomic History    Marital status: Single     Spouse name: None    Number of children: None    Years of education: None    Highest education level: None   Tobacco Use    Smoking status: Never    Smokeless tobacco: Never   Vaping Use    Vaping Use: Never used   Substance and Sexual Activity    Alcohol use: No    Drug use: No       REVIEW OF SYSTEMS    No Known Allergies    Current Outpatient Medications on File Prior to Encounter   Medication Sig Dispense Refill    Insulin Disposable Pump (OMNIPOD 5 G6 PODS, GEN 5,) MISC PT CHANGES POD EVERY 3 DAYS DX: E10.65 *NEED UPDATE INFO      Omega-3 Fatty Acids (FISH OIL) 1000 MG capsule Take 1 capsule by mouth daily      Dulaglutide (TRULICITY) 3 MG/0.5ML SOPN Inject 3 mg into the skin once a week Mondays      Insulin Lispro (HUMALOG KWIKPEN SC) Inject into the skin Inject up to 66 units daily via v-go, via pump      Testosterone 200 MG PLLT by Implant route every 7 days. weekly      aspirin 81 MG chewable tablet Take 1 tablet by mouth in the morning and at bedtime      hydroCHLOROthiazide (HYDRODIURIL) 12.5 MG tablet       losartan (COZAAR) 50 MG tablet       tamsulosin (FLOMAX) 0.4 MG capsule       ibuprofen (IBU) 800 MG tablet Take 1 tablet by mouth every 6 hours as needed for Pain 21 tablet 0    ezetimibe (ZETIA) 10 MG tablet Take 1 tablet by mouth daily 30 tablet 0    naproxen (NAPROSYN) 500 MG tablet Take 1 tablet by mouth 2 times daily (with meals) for 15 doses 30 tablet 0    canagliflozin (INVOKANA) 100 MG TABS tablet Take 1 tablet by mouth every morning (before breakfast)       No current facility-administered medications on file prior to encounter.       Review of Systems   Constitutional:  Positive for activity change (decreased d/t back/leg pain). Negative for appetite change, chills and fever.   HENT:  Positive for dental problem (loose tooth on lower left side). Negative for ear pain, sinus pain, sore throat

## 2024-04-03 ENCOUNTER — ANESTHESIA EVENT (OUTPATIENT)
Dept: OPERATING ROOM | Age: 64
End: 2024-04-03
Payer: MEDICARE

## 2024-04-03 ENCOUNTER — HOSPITAL ENCOUNTER (OUTPATIENT)
Dept: PREADMISSION TESTING | Age: 64
Discharge: HOME | End: 2024-04-03
Attending: NEUROLOGICAL SURGERY | Admitting: NEUROLOGICAL SURGERY
Payer: MEDICARE

## 2024-04-03 VITALS
SYSTOLIC BLOOD PRESSURE: 143 MMHG | OXYGEN SATURATION: 98 % | HEIGHT: 68 IN | DIASTOLIC BLOOD PRESSURE: 89 MMHG | HEART RATE: 84 BPM | TEMPERATURE: 97.9 F | BODY MASS INDEX: 38.04 KG/M2 | RESPIRATION RATE: 16 BRPM | WEIGHT: 251 LBS

## 2024-04-03 LAB
ANION GAP SERPL CALCULATED.3IONS-SCNC: 11 MMOL/L (ref 9–17)
BASOPHILS # BLD: 0.08 K/UL (ref 0–0.2)
BASOPHILS NFR BLD: 1 % (ref 0–2)
BUN SERPL-MCNC: 17 MG/DL (ref 8–23)
CALCIUM SERPL-MCNC: 9.1 MG/DL (ref 8.6–10.4)
CHLORIDE SERPL-SCNC: 101 MMOL/L (ref 98–107)
CO2 SERPL-SCNC: 28 MMOL/L (ref 20–31)
CREAT SERPL-MCNC: 1.7 MG/DL (ref 0.7–1.2)
EOSINOPHIL # BLD: 0.23 K/UL (ref 0–0.4)
EOSINOPHILS RELATIVE PERCENT: 3 % (ref 0–4)
ERYTHROCYTE [DISTWIDTH] IN BLOOD BY AUTOMATED COUNT: 16.2 % (ref 11.5–14.9)
GFR SERPL CREATININE-BSD FRML MDRD: 44 ML/MIN/1.73M2
GLUCOSE SERPL-MCNC: 176 MG/DL (ref 70–99)
HCT VFR BLD AUTO: 40.4 % (ref 41–53)
HGB BLD-MCNC: 12.6 G/DL (ref 13.5–17.5)
LYMPHOCYTES NFR BLD: 2.1 K/UL (ref 1–4.8)
LYMPHOCYTES RELATIVE PERCENT: 28 % (ref 24–44)
MCH RBC QN AUTO: 25.8 PG (ref 26–34)
MCHC RBC AUTO-ENTMCNC: 31.1 G/DL (ref 31–37)
MCV RBC AUTO: 82.9 FL (ref 80–100)
MONOCYTES NFR BLD: 0.75 K/UL (ref 0.1–1.3)
MONOCYTES NFR BLD: 10 % (ref 1–7)
MORPHOLOGY: ABNORMAL
MORPHOLOGY: ABNORMAL
NEUTROPHILS NFR BLD: 58 % (ref 36–66)
NEUTS SEG NFR BLD: 4.34 K/UL (ref 1.3–9.1)
PLATELET # BLD AUTO: 210 K/UL (ref 150–450)
PMV BLD AUTO: 8.8 FL (ref 6–12)
POTASSIUM SERPL-SCNC: 4 MMOL/L (ref 3.7–5.3)
RBC # BLD AUTO: 4.88 M/UL (ref 4.5–5.9)
SODIUM SERPL-SCNC: 140 MMOL/L (ref 135–144)
WBC OTHER # BLD: 7.5 K/UL (ref 3.5–11)

## 2024-04-03 PROCEDURE — 36415 COLL VENOUS BLD VENIPUNCTURE: CPT

## 2024-04-03 PROCEDURE — 80048 BASIC METABOLIC PNL TOTAL CA: CPT

## 2024-04-03 PROCEDURE — APPSS45 APP SPLIT SHARED TIME 31-45 MINUTES: Performed by: NURSE PRACTITIONER

## 2024-04-03 PROCEDURE — 85025 COMPLETE CBC W/AUTO DIFF WBC: CPT

## 2024-04-03 RX ORDER — INSULIN PMP CART,AUT,G6/7,CNTR
EACH SUBCUTANEOUS
COMMUNITY
Start: 2024-01-02

## 2024-04-03 ASSESSMENT — ENCOUNTER SYMPTOMS
VOMITING: 0
SORE THROAT: 0
TROUBLE SWALLOWING: 0
APNEA: 0
COUGH: 0
SHORTNESS OF BREATH: 0
SINUS PAIN: 0
ABDOMINAL PAIN: 0
CONSTIPATION: 0
DIARRHEA: 0
NAUSEA: 0

## 2024-04-03 NOTE — DISCHARGE INSTRUCTIONS
Pre-op Instructions For Out-Patient Surgery    Medication Instructions:  Please stop herbs and any supplements now (includes vitamins and minerals).    Please contact your surgeon and prescribing physician for pre-op instructions for any blood thinners. Stop Aspirin, Ibuprofen & Naproxen as directed    If you have inhalers/aerosol treatments at home, please use them the morning of your surgery and bring the inhalers with you to the hospital.    Please take the following medications the morning of your surgery with a sip of water:    None    Surgery Instructions:  After midnight before surgery:  Do not eat or drink anything, including water, mints, gum, and hard candy.  You may brush your teeth without swallowing.  No smoking, chewing tobacco, or street drugs.    Please shower or bathe before surgery.  If you were given Surgical Scrub Chlorhexidine Gluconate Liquid (CHG), please shower the night before and the morning of your surgery following the detailed instructions you received during your pre-admission visit.     Please do not wear any cologne, lotion, powder, deodorant, jewelry, piercings, perfume, makeup, nail polish, hair accessories, or hair spray on the day of surgery.  Wear loose comfortable clothing.    Leave your valuables at home but bring a payment source for any after-surgery prescriptions you plan to fill at [facility].  Bring a storage case for any glasses/contacts.    An adult who is responsible for you MUST drive you home and should be with you for the first 24 hours after surgery.     If having out-patient knee and foot surgeries, please arrange for planned crutches, walker, or wheelchair before arriving to the hospital.    The Day of Surgery:  Arrive at [facility] Surgery Entrance at the time directed by your surgeon and check in at the desk.     If you have a living will or healthcare power of , please bring a copy.    You will be

## 2024-04-04 ENCOUNTER — OFFICE VISIT (OUTPATIENT)
Age: 64
End: 2024-04-04
Payer: MEDICARE

## 2024-04-04 DIAGNOSIS — M48.062 LUMBAR STENOSIS WITH NEUROGENIC CLAUDICATION: Primary | ICD-10-CM

## 2024-04-04 PROCEDURE — 1123F ACP DISCUSS/DSCN MKR DOCD: CPT | Performed by: NEUROLOGICAL SURGERY

## 2024-04-04 PROCEDURE — 99214 OFFICE O/P EST MOD 30 MIN: CPT | Performed by: NEUROLOGICAL SURGERY

## 2024-04-04 NOTE — PROGRESS NOTES
[facility], [facility], [facility] NEUROSURGERY  [address], [address]  [place] [address]  Dept: [phone]  Dept Fax: [phone]     Patient:  Curtis Ojeda  YOB: 1960  Date: 4/4/24      Chief Complaint   Patient presents with    Follow-up     Preop visit  Open posterior LAMI L2-4 scheduled for 4/10           HPI:     Mr. Ojeda returns to the office today for a preoperative visit ahead of a planned laminectomy next week.  I had last seen him back in November and we planned an L2-3 and L3-4 leg ectomy at that time.  The surgery needed to be delayed because he was found to have poorly controlled diabetes.  His hemoglobin A1c was significantly elevated.  He has been able to improve his glucose control, and his A1c is now less than 8%.  I felt that it was appropriate to have him back to the office because it had been over 4 months since I had seen him and scheduled the surgery.  It sounds as if his symptoms have remained fairly consistent.  He did have a fall recently where he struck his head on the side of the car as he was falling.  He states that he has had some neck pain since this occurred, but his main complaint seems to remain his lower back and lower extremity symptoms.  In fact his symptoms are mostly in the lower extremities.  This seems to be fairly stable when compared to his previous visit.        Physical Exam:      There were no vitals taken for this visit.    He is awake, alert, and in no acute distress.  He answers questions appropriately with clear and fluent speech.  His cranial nerves are grossly intact.  He is moving his extremities well without gross deficit.  His sensation is intact to light touch.  His gait is mildly antalgic, but otherwise normal.    Imaging: We again reviewed the MRI of the lumbar spine, which shows significant stenosis at L2-3 and L3-4.  He has previous fusion hardware at

## 2024-04-09 NOTE — PRE-PROCEDURE INSTRUCTIONS
Nothing to eat after midnight.  Are you taking any blood thinners? When was the last day?  Make sure to use Hibiclens prior to surgery.  Remove any jewelry and body piercings.  Do you wear glasses? If so, please bring a case to store them in.  Are you having any Covid symptoms?  Do you have any new rashes, infections, etc. that we should be aware of?  Do you have a ride home the day of surgery? It cannot be a cab or medical transportation.  Verify surgery time and what time to arrive at hospital.   Left message regarding arrival time, procedure time, npo status after midnight, need for , pre op phone number for any questions.

## 2024-04-10 ENCOUNTER — APPOINTMENT (OUTPATIENT)
Dept: GENERAL RADIOLOGY | Age: 64
End: 2024-04-10
Attending: NEUROLOGICAL SURGERY
Payer: MEDICARE

## 2024-04-10 ENCOUNTER — ANESTHESIA (OUTPATIENT)
Dept: OPERATING ROOM | Age: 64
End: 2024-04-10
Payer: MEDICARE

## 2024-04-10 ENCOUNTER — HOSPITAL ENCOUNTER (OUTPATIENT)
Age: 64
Setting detail: OUTPATIENT SURGERY
Discharge: HOME | End: 2024-04-10
Attending: NEUROLOGICAL SURGERY | Admitting: NEUROLOGICAL SURGERY
Payer: MEDICARE

## 2024-04-10 VITALS
OXYGEN SATURATION: 97 % | SYSTOLIC BLOOD PRESSURE: 148 MMHG | RESPIRATION RATE: 16 BRPM | BODY MASS INDEX: 38.04 KG/M2 | HEIGHT: 68 IN | DIASTOLIC BLOOD PRESSURE: 84 MMHG | WEIGHT: 251 LBS | TEMPERATURE: 97.5 F | HEART RATE: 80 BPM

## 2024-04-10 DIAGNOSIS — G89.18 ACUTE POSTOPERATIVE PAIN: Primary | ICD-10-CM

## 2024-04-10 PROBLEM — M48.061 SPINAL STENOSIS OF LUMBAR REGION: Status: ACTIVE | Noted: 2024-04-10

## 2024-04-10 LAB
GLUCOSE BLD-MCNC: 144 MG/DL (ref 75–110)
GLUCOSE BLD-MCNC: 192 MG/DL (ref 75–110)

## 2024-04-10 PROCEDURE — 6370000000 HC RX 637 (ALT 250 FOR IP): Performed by: ANESTHESIOLOGY

## 2024-04-10 PROCEDURE — 2580000003 HC RX 258: Performed by: ANESTHESIOLOGY

## 2024-04-10 PROCEDURE — 3600000013 HC SURGERY LEVEL 3 ADDTL 15MIN: Performed by: NEUROLOGICAL SURGERY

## 2024-04-10 PROCEDURE — 2709999900 HC NON-CHARGEABLE SUPPLY: Performed by: NEUROLOGICAL SURGERY

## 2024-04-10 PROCEDURE — 7100000010 HC PHASE II RECOVERY - FIRST 15 MIN: Performed by: NEUROLOGICAL SURGERY

## 2024-04-10 PROCEDURE — 3600000003 HC SURGERY LEVEL 3 BASE: Performed by: NEUROLOGICAL SURGERY

## 2024-04-10 PROCEDURE — 6360000002 HC RX W HCPCS: Performed by: ANESTHESIOLOGY

## 2024-04-10 PROCEDURE — 2500000003 HC RX 250 WO HCPCS

## 2024-04-10 PROCEDURE — 6360000002 HC RX W HCPCS

## 2024-04-10 PROCEDURE — 3700000001 HC ADD 15 MINUTES (ANESTHESIA): Performed by: NEUROLOGICAL SURGERY

## 2024-04-10 PROCEDURE — 7100000011 HC PHASE II RECOVERY - ADDTL 15 MIN: Performed by: NEUROLOGICAL SURGERY

## 2024-04-10 PROCEDURE — 7100000001 HC PACU RECOVERY - ADDTL 15 MIN: Performed by: NEUROLOGICAL SURGERY

## 2024-04-10 PROCEDURE — 7100000030 HC ASPR PHASE II RECOVERY - FIRST 15 MIN: Performed by: NEUROLOGICAL SURGERY

## 2024-04-10 PROCEDURE — 82947 ASSAY GLUCOSE BLOOD QUANT: CPT

## 2024-04-10 PROCEDURE — 7100000031 HC ASPR PHASE II RECOVERY - ADDTL 15 MIN: Performed by: NEUROLOGICAL SURGERY

## 2024-04-10 PROCEDURE — 7100000000 HC PACU RECOVERY - FIRST 15 MIN: Performed by: NEUROLOGICAL SURGERY

## 2024-04-10 PROCEDURE — 2720000010 HC SURG SUPPLY STERILE: Performed by: NEUROLOGICAL SURGERY

## 2024-04-10 PROCEDURE — 6360000002 HC RX W HCPCS: Performed by: NEUROLOGICAL SURGERY

## 2024-04-10 PROCEDURE — 3700000000 HC ANESTHESIA ATTENDED CARE: Performed by: NEUROLOGICAL SURGERY

## 2024-04-10 PROCEDURE — 2500000003 HC RX 250 WO HCPCS: Performed by: NEUROLOGICAL SURGERY

## 2024-04-10 RX ORDER — BUPIVACAINE HYDROCHLORIDE AND EPINEPHRINE 5; 5 MG/ML; UG/ML
INJECTION, SOLUTION EPIDURAL; INTRACAUDAL; PERINEURAL PRN
Status: DISCONTINUED | OUTPATIENT
Start: 2024-04-10 | End: 2024-04-10 | Stop reason: ALTCHOICE

## 2024-04-10 RX ORDER — ACETAMINOPHEN 500 MG
1000 TABLET ORAL ONCE
Status: COMPLETED | OUTPATIENT
Start: 2024-04-10 | End: 2024-04-10

## 2024-04-10 RX ORDER — LIDOCAINE HYDROCHLORIDE 10 MG/ML
1 INJECTION, SOLUTION EPIDURAL; INFILTRATION; INTRACAUDAL; PERINEURAL
Status: DISCONTINUED | OUTPATIENT
Start: 2024-04-10 | End: 2024-04-10 | Stop reason: HOSPADM

## 2024-04-10 RX ORDER — DEXAMETHASONE SODIUM PHOSPHATE 4 MG/ML
INJECTION, SOLUTION INTRA-ARTICULAR; INTRALESIONAL; INTRAMUSCULAR; INTRAVENOUS; SOFT TISSUE PRN
Status: DISCONTINUED | OUTPATIENT
Start: 2024-04-10 | End: 2024-04-10 | Stop reason: SDUPTHER

## 2024-04-10 RX ORDER — ROCURONIUM BROMIDE 10 MG/ML
INJECTION, SOLUTION INTRAVENOUS PRN
Status: DISCONTINUED | OUTPATIENT
Start: 2024-04-10 | End: 2024-04-10 | Stop reason: SDUPTHER

## 2024-04-10 RX ORDER — MIDAZOLAM HYDROCHLORIDE 1 MG/ML
INJECTION INTRAMUSCULAR; INTRAVENOUS PRN
Status: DISCONTINUED | OUTPATIENT
Start: 2024-04-10 | End: 2024-04-10 | Stop reason: SDUPTHER

## 2024-04-10 RX ORDER — TIZANIDINE 2 MG/1
2 TABLET ORAL EVERY 8 HOURS PRN
Qty: 30 TABLET | Refills: 2 | Status: SHIPPED | OUTPATIENT
Start: 2024-04-10

## 2024-04-10 RX ORDER — FENTANYL CITRATE 50 UG/ML
INJECTION, SOLUTION INTRAMUSCULAR; INTRAVENOUS PRN
Status: DISCONTINUED | OUTPATIENT
Start: 2024-04-10 | End: 2024-04-10 | Stop reason: SDUPTHER

## 2024-04-10 RX ORDER — SENNOSIDES A AND B 8.6 MG/1
1 TABLET, FILM COATED ORAL 2 TIMES DAILY
Qty: 30 TABLET | Refills: 1 | Status: SHIPPED | OUTPATIENT
Start: 2024-04-10

## 2024-04-10 RX ORDER — LIDOCAINE HYDROCHLORIDE 20 MG/ML
INJECTION, SOLUTION EPIDURAL; INFILTRATION; INTRACAUDAL; PERINEURAL PRN
Status: DISCONTINUED | OUTPATIENT
Start: 2024-04-10 | End: 2024-04-10 | Stop reason: SDUPTHER

## 2024-04-10 RX ORDER — DIPHENHYDRAMINE HYDROCHLORIDE 50 MG/ML
12.5 INJECTION INTRAMUSCULAR; INTRAVENOUS
Status: DISCONTINUED | OUTPATIENT
Start: 2024-04-10 | End: 2024-04-10 | Stop reason: HOSPADM

## 2024-04-10 RX ORDER — ONDANSETRON 2 MG/ML
4 INJECTION INTRAMUSCULAR; INTRAVENOUS
Status: DISCONTINUED | OUTPATIENT
Start: 2024-04-10 | End: 2024-04-10 | Stop reason: HOSPADM

## 2024-04-10 RX ORDER — SODIUM CHLORIDE 0.9 % (FLUSH) 0.9 %
5-40 SYRINGE (ML) INJECTION EVERY 12 HOURS SCHEDULED
Status: DISCONTINUED | OUTPATIENT
Start: 2024-04-10 | End: 2024-04-10 | Stop reason: HOSPADM

## 2024-04-10 RX ORDER — HYDROCODONE BITARTRATE AND ACETAMINOPHEN 5; 325 MG/1; MG/1
1 TABLET ORAL EVERY 6 HOURS PRN
Qty: 28 TABLET | Refills: 0 | Status: SHIPPED | OUTPATIENT
Start: 2024-04-10 | End: 2024-04-17

## 2024-04-10 RX ORDER — OXYCODONE HYDROCHLORIDE AND ACETAMINOPHEN 5; 325 MG/1; MG/1
1 TABLET ORAL
Status: COMPLETED | OUTPATIENT
Start: 2024-04-10 | End: 2024-04-10

## 2024-04-10 RX ORDER — SODIUM CHLORIDE 9 MG/ML
INJECTION, SOLUTION INTRAVENOUS CONTINUOUS
Status: DISCONTINUED | OUTPATIENT
Start: 2024-04-10 | End: 2024-04-10 | Stop reason: HOSPADM

## 2024-04-10 RX ORDER — SODIUM CHLORIDE 0.9 % (FLUSH) 0.9 %
5-40 SYRINGE (ML) INJECTION PRN
Status: DISCONTINUED | OUTPATIENT
Start: 2024-04-10 | End: 2024-04-10 | Stop reason: HOSPADM

## 2024-04-10 RX ORDER — ONDANSETRON 2 MG/ML
INJECTION INTRAMUSCULAR; INTRAVENOUS PRN
Status: DISCONTINUED | OUTPATIENT
Start: 2024-04-10 | End: 2024-04-10 | Stop reason: SDUPTHER

## 2024-04-10 RX ORDER — SODIUM CHLORIDE 9 MG/ML
INJECTION, SOLUTION INTRAVENOUS PRN
Status: DISCONTINUED | OUTPATIENT
Start: 2024-04-10 | End: 2024-04-10 | Stop reason: HOSPADM

## 2024-04-10 RX ORDER — SCOLOPAMINE TRANSDERMAL SYSTEM 1 MG/1
1 PATCH, EXTENDED RELEASE TRANSDERMAL ONCE
Status: DISCONTINUED | OUTPATIENT
Start: 2024-04-10 | End: 2024-04-10 | Stop reason: HOSPADM

## 2024-04-10 RX ORDER — PROPOFOL 10 MG/ML
INJECTION, EMULSION INTRAVENOUS PRN
Status: DISCONTINUED | OUTPATIENT
Start: 2024-04-10 | End: 2024-04-10 | Stop reason: SDUPTHER

## 2024-04-10 RX ADMIN — HYDROMORPHONE HYDROCHLORIDE 0.5 MG: 1 INJECTION, SOLUTION INTRAMUSCULAR; INTRAVENOUS; SUBCUTANEOUS at 17:50

## 2024-04-10 RX ADMIN — ROCURONIUM BROMIDE 50 MG: 10 INJECTION, SOLUTION INTRAVENOUS at 13:57

## 2024-04-10 RX ADMIN — FENTANYL CITRATE 25 MCG: 50 INJECTION, SOLUTION INTRAMUSCULAR; INTRAVENOUS at 16:40

## 2024-04-10 RX ADMIN — FENTANYL CITRATE 25 MCG: 50 INJECTION, SOLUTION INTRAMUSCULAR; INTRAVENOUS at 17:11

## 2024-04-10 RX ADMIN — DEXAMETHASONE SODIUM PHOSPHATE 8 MG: 4 INJECTION INTRA-ARTICULAR; INTRALESIONAL; INTRAMUSCULAR; INTRAVENOUS; SOFT TISSUE at 14:05

## 2024-04-10 RX ADMIN — ONDANSETRON 4 MG: 2 INJECTION INTRAMUSCULAR; INTRAVENOUS at 17:01

## 2024-04-10 RX ADMIN — ACETAMINOPHEN 1000 MG: 500 TABLET ORAL at 12:19

## 2024-04-10 RX ADMIN — PROPOFOL 170 MG: 10 INJECTION, EMULSION INTRAVENOUS at 13:56

## 2024-04-10 RX ADMIN — SODIUM CHLORIDE: 9 INJECTION, SOLUTION INTRAVENOUS at 12:49

## 2024-04-10 RX ADMIN — Medication 2000 MG: at 14:07

## 2024-04-10 RX ADMIN — LIDOCAINE HYDROCHLORIDE 80 MG: 20 INJECTION, SOLUTION EPIDURAL; INFILTRATION; INTRACAUDAL; PERINEURAL at 13:56

## 2024-04-10 RX ADMIN — OXYCODONE AND ACETAMINOPHEN 1 TABLET: 5; 325 TABLET ORAL at 18:29

## 2024-04-10 RX ADMIN — SODIUM CHLORIDE: 9 INJECTION, SOLUTION INTRAVENOUS at 17:16

## 2024-04-10 RX ADMIN — FENTANYL CITRATE 50 MCG: 50 INJECTION, SOLUTION INTRAMUSCULAR; INTRAVENOUS at 13:56

## 2024-04-10 RX ADMIN — MIDAZOLAM 2 MG: 1 INJECTION INTRAMUSCULAR; INTRAVENOUS at 13:54

## 2024-04-10 RX ADMIN — SUGAMMADEX 200 MG: 100 INJECTION, SOLUTION INTRAVENOUS at 17:08

## 2024-04-10 ASSESSMENT — PAIN - FUNCTIONAL ASSESSMENT
PAIN_FUNCTIONAL_ASSESSMENT: 0-10
PAIN_FUNCTIONAL_ASSESSMENT: 0-10

## 2024-04-10 ASSESSMENT — PAIN DESCRIPTION - ORIENTATION
ORIENTATION: LOWER

## 2024-04-10 ASSESSMENT — PAIN SCALES - GENERAL
PAINLEVEL_OUTOF10: 8
PAINLEVEL_OUTOF10: 9
PAINLEVEL_OUTOF10: 8

## 2024-04-10 ASSESSMENT — PAIN DESCRIPTION - LOCATION
LOCATION: BACK

## 2024-04-10 ASSESSMENT — PAIN DESCRIPTION - FREQUENCY: FREQUENCY: CONTINUOUS

## 2024-04-10 ASSESSMENT — PAIN DESCRIPTION - PAIN TYPE
TYPE: SURGICAL PAIN
TYPE: SURGICAL PAIN

## 2024-04-10 ASSESSMENT — PAIN DESCRIPTION - DESCRIPTORS
DESCRIPTORS: ACHING;BURNING
DESCRIPTORS: ACHING;BURNING

## 2024-04-10 NOTE — INTERVAL H&P NOTE
Update History & Physical    The patient's History and Physical of April 3, 2024 was reviewed with the patient and I examined the patient. There was no change. The surgical site was confirmed by the patient and me. Pt undergoing for  OPEN LUMBAR LAMINECTOMY POSTERIOR L2-L4 per Dr. Regalado.   Pt denies fever/chills, chest pain or SOB   Pt Npo since the past midnight, no am medication today   Denies hx of MRSA infection.  Denies hx of blood clots.  Anticoagulations:  pt stopped taking ASA Ibuprofen Naprosyn one week ago   PONV  Denies hx of any other other personal or family hx of complications w/anesthesia  Physical exam remains unchanged including cardiac and pulmonary assessment  See nursing flow sheet for vital sings    Lab Results   Component Value Date    WBC 7.5 04/03/2024    HGB 12.6 (L) 04/03/2024    HCT 40.4 (L) 04/03/2024    MCV 82.9 04/03/2024     04/03/2024     Lab Results   Component Value Date/Time     04/03/2024 01:30 PM    K 4.0 04/03/2024 01:30 PM     04/03/2024 01:30 PM    CO2 28 04/03/2024 01:30 PM    BUN 17 04/03/2024 01:30 PM    CREATININE 1.7 04/03/2024 01:30 PM    GLUCOSE 176 04/03/2024 01:30 PM    GLUCOSE 203 01/23/2024 09:26 AM    CALCIUM 9.1 04/03/2024 01:30 PM    LABGLOM 44 04/03/2024 01:30 PM          Electronically signed by DIXIE Fay CNP on 4/10/2024 at 11:47 AM

## 2024-04-10 NOTE — DISCHARGE INSTR - MEDS
You may resume aspirin on 4/17.  It is okay to take ibuprofen, naproxen, or acetaminophen over-the-counter in addition to your prescription pain medication, but your prescription pain medication does contain acetaminophen, so you must pay careful attention to the total amount of acetaminophen you are taking each day, and do not exceed 4000mg in any 24 hour period.

## 2024-04-10 NOTE — DISCHARGE INSTRUCTIONS
lightly (broth, soup, crackers, toast, etc.) advancing as tolerated to your usual diet.  Try to avoid spicy and greasy/fatty foods for 24 hours. Drink plenty of fluids after surgery, unless you are on a fluid restriction.  Avoid milk/milk product for several hours.    Call your surgeon for the following:  You have pain that does not get better after you take pain medicine.   For an oral temperature (by mouth) is 101 degrees or higher, chills, or excessive sweating.  You have increasing and progressive bleeding or drainage from surgery site.  Signs of an infection:  increased swelling, redness, warmth, or hardness around surgery area or yellow or green drainage.  Persistent nausea or vomiting and can’t keep fluids down.  If you are unable to urinate within 8 hours of surgery.  Redness or swelling at IV site.  For any questions or concerns you may have.     Remove Patch From Behind Right Ear On Saturday April 13th

## 2024-04-10 NOTE — OP NOTE
Operative Note      Patient: Curtis Ojeda  YOB: 1960  MRN: [id number]    Date of Procedure: 4/10/2024    Pre-Op Diagnosis Codes:     * Spinal stenosis of lumbar region, unspecified whether neurogenic claudication present [M48.061]    Post-Op Diagnosis: Same       Procedure(s):  OPEN LUMBAR LAMINECTOMY POSTERIOR L2-L4    Surgeon(s):  José Miguel Regalado MD    Assistant:   * No surgical staff found *    Anesthesia: General    Estimated Blood Loss (mL): 50    Complications: None    Specimens:   * No specimens in log *    Implants:  * No implants in log *      Drains: * No LDAs found *    Findings:  Infection Present At Time Of Surgery (PATOS) (choose all levels that have infection present):  No infection present  Other Findings: Stenosis at L2-3 and L3-4    Indications for Procedure:  The patient is a 63-year-old gentleman who has been having difficulty with pain in the lower back and lower extremities consistent with neurogenic claudication.  Imaging shows significant canal stenosis at L2-3 and L3-4, which are just proximal to his previous fusion at L4-5.  Earlier records may refer to his fusion as being at L3-4, but the patient has transitional anatomy that was not accounted for with the lability that way.  He actually has a lumbarized S1 vertebra, and the fusion is at L4-5.  He had tried and failed conservative measures for pain management, but his pain persisted.  He was felt to be a good candidate for surgical decompression.  The risks, benefits, and alternatives to this approach were discussed with the patient at length.  After having an opportunity to have his questions answered he provided his consent to proceed with surgery.    Detailed Description of Procedure:   The patient was taken to the operating room and general endotracheal anesthesia was successfully induced by the anesthesia service without incident.  He was positioned in a prone position on a Francisco frame on a Teodoro table.  Care was taken

## 2024-04-10 NOTE — ANESTHESIA PRE PROCEDURE
Department of Anesthesiology  Preprocedure Note       Name:  Curtis Ojeda   Age:  63 y.o.  :  1960                                          MRN:  [id number]         Date:  4/10/2024      Surgeon: Surgeon(s):  José Miguel Regalado MD    Procedure: Procedure(s):  OPEN LUMBAR LAMINECTOMY POSTERIOR L2-L4    Medications prior to admission:   Prior to Admission medications    Medication Sig Start Date End Date Taking? Authorizing Provider   Insulin Disposable Pump (OMNIPOD 5 G6 PODS, GEN 5,) MISC PT CHANGES POD EVERY 3 DAYS DX: E10.65 *NEED UPDATE INFO 24   Jenelle Blood MD   Omega-3 Fatty Acids (FISH OIL) 1000 MG capsule Take 1 capsule by mouth daily    Jenelle Blood MD   Dulaglutide (TRULICITY) 3 MG/0.5ML SOPN Inject 3 mg into the skin once a week     Jenelle Blood MD   Insulin Lispro (HUMALOG KWIKPEN SC) Inject into the skin Inject up to 66 units daily via v-go, via pump    Jenelle Blood MD   Testosterone 200 MG PLLT by Implant route every 7 days. weekly    Jenelle Blood MD   aspirin 81 MG chewable tablet Take 1 tablet by mouth in the morning and at bedtime    Jenelle Blood MD   hydroCHLOROthiazide (HYDRODIURIL) 12.5 MG tablet  23   Jenelle Blood MD   losartan (COZAAR) 50 MG tablet  23   Jenelle Blood MD   tamsulosin (FLOMAX) 0.4 MG capsule  23   Jenelle Blood MD   ibuprofen (IBU) 800 MG tablet Take 1 tablet by mouth every 6 hours as needed for Pain 10/19/21   Karla Gonzalez APRN - CNP   ezetimibe (ZETIA) 10 MG tablet Take 1 tablet by mouth daily 17   Keira Nicole APRN - CNP   naproxen (NAPROSYN) 500 MG tablet Take 1 tablet by mouth 2 times daily (with meals) for 15 doses 17  Luisa Rooney MD   canagliflozin (INVOKANA) 100 MG TABS tablet Take 1 tablet by mouth every morning (before breakfast)    Jenelle Blood MD       Current medications:    Current Facility-Administered Medications

## 2024-04-10 NOTE — PROGRESS NOTES
Drsg to back 1/2 way saturated with bloody drainage. Dressing removed to replace and writer noticed a trickle of blood from the lower potion of the incision, between steri strip 3 and 4. Pressure held to area. At 1851 Perfect serve sent to dr Regalado to inform him that after 15 min of direct pressure there was still bleeding from the incision. Dr Regalado wanted writer to remove the bottom steri strips to see where bleeding was coming from and apply pressure to that area. Bottom 3 steri strips remove and writer saw where the bleeding was and applied direct pressure. Pressure was applied for 15 min then writer checked site. Drop of blood noted at site so an additional 5 min of pressure held. When writer looked there was no further bleeding. The 3 steri strips were replaced with 3 new and a new pressure dressing was applied.

## 2024-04-11 ENCOUNTER — HOSPITAL ENCOUNTER (EMERGENCY)
Age: 64
Discharge: HOME | End: 2024-04-11
Attending: EMERGENCY MEDICINE
Payer: MEDICARE

## 2024-04-11 VITALS
BODY MASS INDEX: 38.17 KG/M2 | WEIGHT: 251 LBS | HEART RATE: 88 BPM | TEMPERATURE: 98.1 F | RESPIRATION RATE: 18 BRPM | SYSTOLIC BLOOD PRESSURE: 147 MMHG | OXYGEN SATURATION: 98 % | DIASTOLIC BLOOD PRESSURE: 90 MMHG

## 2024-04-11 DIAGNOSIS — I97.89 POSTOPERATIVE SURGICAL COMPLICATION INVOLVING CIRCULATORY SYSTEM ASSOCIATED WITH CIRCULATORY PROCEDURE, UNSPECIFIED COMPLICATION: Primary | ICD-10-CM

## 2024-04-11 PROCEDURE — 99282 EMERGENCY DEPT VISIT SF MDM: CPT

## 2024-04-11 NOTE — ED NOTES
Writer assisted Dr. Goldberger with the removal of patient's surgical dressing. Patient's dressing presented with a pressure dressing saturated with sanguinous fluid, with a hand towel that was folded in half on top of the pressure dressing that were also saturated with sanguinous fluid. Patient then had an abdominal binder encased around him. Patient's wife stated \"the nurses said this would help with the pressure from the bleeding.\"

## 2024-04-11 NOTE — ED PROVIDER NOTES
EMERGENCY DEPARTMENT ENCOUNTER    Pt Name: Curtis Ojeda  MRN: [id number]  Birthdate 1960  Date of evaluation: 4/11/24  CHIEF COMPLAINT       Chief Complaint   Patient presents with    Post-op Problem     Bleeding from site      HISTORY OF PRESENT ILLNESS   63-year-old male presents emergency room less than 1 day after L2-L4 laminectomy.  Patient had surgery at [facility].  After surgery he had some increased bleeding.  They were able to use some compression and bleeding did stop.  Today when he got up to go to the bathroom he felt white blood running down his back prompting return to the emergency room.  Patient's pain is reasonably controlled.  He is ambulating and moving around without significant difficulty.             REVIEW OF SYSTEMS     Review of Systems  PASTMEDICAL HISTORY     Past Medical History:   Diagnosis Date    Balance problem     Cerebral artery occlusion with cerebral infarction (HCC) 2010    \"mini stroke after my back surgery\"    CKD (chronic kidney disease)     [facility] nephrology    Diabetes mellitus ([facility])     CGM, sees     History of stress test     X2, per pt last one was before 2020 and did not need follow up.    Hyperlipidemia     Hypertension     Migraines     have not had any in a long time    PONV (postoperative nausea and vomiting)     Stroke ([facility]) 2003    Under care of team     [facility] cardiology     Past Problem List  Patient Active Problem List   Diagnosis Code    Spinal stenosis of lumbar region M48.061     SURGICAL HISTORY       Past Surgical History:   Procedure Laterality Date    BACK SURGERY  2010    BICEPS TENDON REPAIR Left     COLONOSCOPY      KNEE SURGERY Right 2016    \"moved muscles\"    ROTATOR CUFF REPAIR Left      CURRENT MEDICATIONS       Previous Medications    ASPIRIN 81 MG CHEWABLE TABLET    Take 1 tablet by mouth in the morning and at bedtime    CANAGLIFLOZIN (INVOKANA) 100 MG TABS TABLET    Take 1 tablet by mouth every morning (before

## 2024-04-11 NOTE — ED NOTES
Writer placed abdominal pad on top of incision with paper tape on all four sides of pad. Writer placed an additional abdominal pad on top with paper tape on all four sides. No difficulties noted.

## 2024-04-11 NOTE — ANESTHESIA POSTPROCEDURE EVALUATION
Department of Anesthesiology  Postprocedure Note    Patient: Curtis Ojeda  MRN: [id number]  YOB: 1960  Date of evaluation: 4/11/2024    Procedure Summary       Date: 04/10/24 Room / Location: [address]    Anesthesia Start: 1352 Anesthesia Stop: 1725    Procedure: OPEN LUMBAR LAMINECTOMY POSTERIOR L2-L4 (Spine Lumbar) Diagnosis:       Spinal stenosis of lumbar region, unspecified whether neurogenic claudication present      (Spinal stenosis of lumbar region, unspecified whether neurogenic claudication present [M48.061])    Surgeons: José Miguel Regalado MD Responsible Provider: Tricia Solomon MD    Anesthesia Type: general ASA Status: 3            Anesthesia Type: No value filed.    Matthew Phase I: Matthew Score: 10    Matthew Phase II: Matthew Score: 10    Anesthesia Post Evaluation    Comments: POST- ANESTHESIA EVALUATION       Pt Name: Curtis Ojeda  MRN: [id number]  YOB: 1960  Date of evaluation: 4/11/2024  Time:  12:49 PM      BP (!) 148/84   Pulse 80   Temp 97.5 °F (36.4 °C) (Infrared)   Resp 16   Ht 1.727 m (5' 7.99\")   Wt 113.9 kg (251 lb)   SpO2 97%   BMI 38.17 kg/m²      Consciousness Level  Awake  Cardiopulmonary Status  Stable  Pain Adequately Treated YES  Nausea / Vomiting  NO  Adequate Hydration  YES  Anesthesia Related Complications NONE      Electronically signed by Bozena Rolon MD on 4/11/2024 at 12:49 PM           No notable events documented.

## 2024-04-11 NOTE — ED NOTES
Patient arrived to ED accompanied with family with c/o post-op complications. Patient reports he had surgery earlier this evening at [facility]. When he arrived at home he went to use the bathroom he felt drainage fall down his buttocks. He contacted his nurse and they advised him that if the bleeding continued to go to the ED. Patient presents to the ED with abdominal binder on, with long sleeve shirt on as well.     Dr. Goldberger notified of presenting situation and advised to leave patient in binder and shirt. She will assist writer to remove both binder and clothing.

## 2024-04-15 ENCOUNTER — TELEPHONE (OUTPATIENT)
Age: 64
End: 2024-04-15

## 2024-04-15 NOTE — TELEPHONE ENCOUNTER
4/16 pt's wife LMOM about bleeding and brusing at base of incision in lumbar spine.  Pt had L2-L4 laminectomies on 4/10. Went to ER 4/11 for continued bleeding at incision - stopped on it's own in ER so new steristrips were applied.  I called and LMOM can see tomorrow in office or if still bleeding to head back to ER.  They called back - has some crustiness at lower incision and steristips still on. Not sure what is going on. But no fresh blood.  Gave him appt in am at 8:30 am du to Greta's schedule.  JADE Browne RN

## 2024-04-16 ENCOUNTER — NURSE ONLY (OUTPATIENT)
Age: 64
End: 2024-04-16

## 2024-04-16 ENCOUNTER — HOSPITAL ENCOUNTER (OUTPATIENT)
Age: 64
Discharge: HOME | End: 2024-04-18
Attending: NEUROLOGICAL SURGERY
Payer: MEDICARE

## 2024-04-16 DIAGNOSIS — M79.89 PAIN AND SWELLING OF LOWER LEG, RIGHT: ICD-10-CM

## 2024-04-16 DIAGNOSIS — M79.662 PAIN AND SWELLING OF LOWER LEG, LEFT: Primary | ICD-10-CM

## 2024-04-16 DIAGNOSIS — M79.89 PAIN AND SWELLING OF LOWER LEG, LEFT: Primary | ICD-10-CM

## 2024-04-16 DIAGNOSIS — M79.662 PAIN AND SWELLING OF LOWER LEG, LEFT: ICD-10-CM

## 2024-04-16 DIAGNOSIS — M79.661 PAIN AND SWELLING OF LOWER LEG, RIGHT: ICD-10-CM

## 2024-04-16 DIAGNOSIS — M79.89 PAIN AND SWELLING OF LOWER LEG, LEFT: ICD-10-CM

## 2024-04-16 PROCEDURE — 93970 EXTREMITY STUDY: CPT

## 2024-04-16 PROCEDURE — 93970 EXTREMITY STUDY: CPT | Performed by: SURGERY

## 2024-04-16 NOTE — PROGRESS NOTES
Nurse Visit Suture Removal/Wound Check        Date:  2024   Patient:  Curtis Ojeda   :  1960   Reason for appt:  wound check    Operating Physician:  Dr Fabricio MD    Procedure:  Bilateral L2-L4 laminectomies  Date of Procedure:  4/10/24  Vital signs:  T 98.4*F oral  Patient reported S/SX post-op:   [] Fever     [x] Drainage   [] Redness   [] Swelling    [] Pain   [x] Other pt's iwfe called yesterday to report his wound seems to have stopped bleeding a couple of days ago (seen in ER  due to bleeding - stopped on its own so the MD applied new steristrips). He has bruising and crustiness at lower incision and the steristips are still on. No fresh blood seen.     Current Drainage:  no    Skin Edges Approximated:  yes - well approximated, steristrips intact    Current Observation of Incision Site/Wound:  healing nicely. No signs of infection or fluid at incision.    Evaluated by Physician:  yes - agrees back wound looks good. Concern about bilateral leg and ankle swelling. Pt is taking short walks several times per day around the house. Dr Regalado discussed activity and encouraged walking.     Pain Assessment:  incisional pain - improving. Leg pain minimal to none in regards to preop symptoms. Has a lot of spasms in back and from posterior thighs up to buttocks bilaterally. Pt is currently taking norco prn and tizanidine. He has restarted his aspirin 81 mg daily. He is having some constipation - had small BM, + flatus. He is taking Senakot bid postop. Aware ok to add LOC prn also.    Treatment:  pitting edema in LE s from calves to feet. + tenderness, Deidra's negative. Has been sitting more than usual. No chest pain or SOB. Per Dr Regalado - STAT venous scans to ck for DVT post op    Ok to shower in am; ok to soak in tub/pool in 1 week. Pt currently is bathing with the help of his wife as it is difficult for him to get into their tub/shower. Aware ok to gently wash wound w soap and water and let

## 2024-04-16 NOTE — PROGRESS NOTES
Bilateral venous scans of LE s are neg for DVT or superficial clots. I called and Greta is aware. JADE Browne RN

## 2024-04-30 ENCOUNTER — OFFICE VISIT (OUTPATIENT)
Age: 64
End: 2024-04-30

## 2024-04-30 VITALS
SYSTOLIC BLOOD PRESSURE: 117 MMHG | WEIGHT: 255 LBS | DIASTOLIC BLOOD PRESSURE: 73 MMHG | HEART RATE: 80 BPM | BODY MASS INDEX: 38.78 KG/M2

## 2024-04-30 DIAGNOSIS — M48.062 LUMBAR STENOSIS WITH NEUROGENIC CLAUDICATION: Primary | ICD-10-CM

## 2024-04-30 PROCEDURE — 99024 POSTOP FOLLOW-UP VISIT: CPT | Performed by: NEUROLOGICAL SURGERY

## 2024-04-30 NOTE — PROGRESS NOTES
[facility], [facility] NEUROSCIENCE [place], [facility] NEUROSURGERY  [address], [address]  [place] [address]  Dept: [phone]  Dept Fax: [phone]     Patient:  Curtis Ojeda  YOB: 1960  Date: 4/30/24      Chief Complaint   Patient presents with    Other     2 week 1st post op-L2-4 Lami 4/10/24 - moved up from 12:50pm           HPI:     Mr. Ojeda returns to the office today for follow-up after undergoing an L2-3 and L3-4 laminectomy on April 10.  He reports that he is doing well overall.  He has not been having pain in his legs.  He has been off the prescription pain medication for some time.  He does use Tylenol occasionally.  He did report overdoing it last week 1 day and having some pain afterward, but for the most part he has been getting around well.  He has been having a little difficulty with constipation, but is trying over-the-counter remedies.  He is pleased with his progress to this point.      Physical Exam:      /73   Pulse 80   Wt 115.7 kg (255 lb)   BMI 38.78 kg/m²     He is awake, alert, and in no acute distress.  He answers questions appropriately with clear and fluent speech.  His cranial nerves are grossly intact.  He is moving his extremities without gross deficit.  His gait is mildly antalgic, but otherwise normal.  The incision is healing well without erythema or induration.    Assessment and Plan:     1. Lumbar stenosis with neurogenic claudication        Mr. Ojeda is off to a good start after undergoing an L2-3 and L3-4 laminectomy about 3 weeks ago.  I reminded him to continue with his activity restrictions.  I will see him back in the office in about 1 month.  He is welcome to contact me in the meantime with any questions or concerns.      Electronically signed by José Miguel Regalado MD on 4/30/2024 at 9:17 AM    Please note that this chart was generated using voice recognition Dragon dictation software.

## 2024-05-28 ENCOUNTER — OFFICE VISIT (OUTPATIENT)
Age: 64
End: 2024-05-28

## 2024-05-28 VITALS
SYSTOLIC BLOOD PRESSURE: 132 MMHG | WEIGHT: 256 LBS | BODY MASS INDEX: 38.8 KG/M2 | HEIGHT: 68 IN | DIASTOLIC BLOOD PRESSURE: 87 MMHG | HEART RATE: 87 BPM

## 2024-05-28 DIAGNOSIS — M48.062 LUMBAR STENOSIS WITH NEUROGENIC CLAUDICATION: Primary | ICD-10-CM

## 2024-05-28 PROCEDURE — 99024 POSTOP FOLLOW-UP VISIT: CPT | Performed by: NEUROLOGICAL SURGERY

## (undated) DEVICE — DRESSING BORDERED ADH GZ UNIV GEN USE 8INX4IN AND 6INX2IN

## (undated) DEVICE — GLOVE SURG SZ 8 L12IN FNGR THK79MIL GRN LTX FREE

## (undated) DEVICE — TUBING, SUCTION, 3/16" X 10', STRAIGHT: Brand: MEDLINE

## (undated) DEVICE — BLADE ES ELASTOMERIC COAT INSUL DURABLE BEND UPTO 90DEG

## (undated) DEVICE — SYRINGE MED 10ML LUERLOCK TIP W/O SFTY DISP

## (undated) DEVICE — SUTURE VICRYL + SZ 2-0 L18IN ABSRB UD CT1 L36MM 1/2 CIR VCP839D

## (undated) DEVICE — 4-PORT MANIFOLD: Brand: NEPTUNE 2

## (undated) DEVICE — SUTURE VICRYL + SZ 0 L27IN ABSRB UD L36MM CT-1 1/2 CIR VCPP41D

## (undated) DEVICE — SPONGE,NEURO,0.5"X0.5",XR,STRL,10/PK: Brand: MEDLINE

## (undated) DEVICE — STRIP,CLOSURE,WOUND,MEDI-STRIP,1/2X4: Brand: MEDLINE

## (undated) DEVICE — BLANKET WRM W40.2XL55.9IN IORT LO BODY + MISTRAL AIR

## (undated) DEVICE — NEEDLE HYPO 25GA L1.5IN BLU POLYPR HUB S STL REG BVL STR

## (undated) DEVICE — SUTURE MONOCRYL + SZ 4-0 L27IN ABSRB UD L19MM PS-2 3/8 CIR MCP426H

## (undated) DEVICE — SHEET,DRAPE,53X77,STERILE: Brand: MEDLINE

## (undated) DEVICE — 1010 S-DRAPE TOWEL DRAPE 10/BX: Brand: STERI-DRAPE™

## (undated) DEVICE — 3.0MM PRECISION NEURO (MATCH HEAD)

## (undated) DEVICE — MASTISOL ADHESIVE LIQ 2/3ML

## (undated) DEVICE — SOLUTION IRRIG 1000ML STRL H2O USP PLAS POUR BTL

## (undated) DEVICE — SUTURE VICRYL + SZ 3-0 L18IN ABSRB UD SH 1/2 CIR TAPERCUT NDL VCP864D

## (undated) DEVICE — SOLUTION IRRIG 1000ML 0.9% SOD CHL USP POUR PLAS BTL

## (undated) DEVICE — GLOVE ORANGE PI 7 1/2   MSG9075

## (undated) DEVICE — SUTURE VICRYL + SZ 0 L18IN ABSRB UD L36MM CT-1 1/2 CIR VCP840D

## (undated) DEVICE — AGENT HEMSTAT 1GM PORCINE GEL ABSRB PWD FOR CONT OOZING

## (undated) DEVICE — ST CHARLES DR BEEKS SPINE: Brand: MEDLINE INDUSTRIES, INC.

## (undated) DEVICE — NDL CNTR 40CT FM MAG: Brand: MEDLINE INDUSTRIES, INC.